# Patient Record
Sex: MALE | Race: BLACK OR AFRICAN AMERICAN
[De-identification: names, ages, dates, MRNs, and addresses within clinical notes are randomized per-mention and may not be internally consistent; named-entity substitution may affect disease eponyms.]

---

## 2020-05-03 ENCOUNTER — HOSPITAL ENCOUNTER (EMERGENCY)
Dept: HOSPITAL 53 - M ED | Age: 45
Discharge: HOME | End: 2020-05-03
Payer: COMMERCIAL

## 2020-05-03 VITALS — BODY MASS INDEX: 36.72 KG/M2 | HEIGHT: 66 IN | WEIGHT: 228.49 LBS

## 2020-05-03 VITALS — DIASTOLIC BLOOD PRESSURE: 58 MMHG | SYSTOLIC BLOOD PRESSURE: 126 MMHG

## 2020-05-03 DIAGNOSIS — R94.31: ICD-10-CM

## 2020-05-03 DIAGNOSIS — F14.90: ICD-10-CM

## 2020-05-03 DIAGNOSIS — E78.5: ICD-10-CM

## 2020-05-03 DIAGNOSIS — F17.210: ICD-10-CM

## 2020-05-03 DIAGNOSIS — E11.9: ICD-10-CM

## 2020-05-03 DIAGNOSIS — R10.84: Primary | ICD-10-CM

## 2020-05-03 DIAGNOSIS — N28.89: ICD-10-CM

## 2020-05-03 DIAGNOSIS — I10: ICD-10-CM

## 2020-05-03 LAB
ALBUMIN SERPL BCG-MCNC: 3.6 GM/DL (ref 3.2–5.2)
ALT SERPL W P-5'-P-CCNC: 32 U/L (ref 12–78)
AMPHETAMINES UR QL SCN: NEGATIVE
BARBITURATES UR QL SCN: NEGATIVE
BASOPHILS # BLD AUTO: 0 10^3/UL (ref 0–0.2)
BASOPHILS NFR BLD AUTO: 0.5 % (ref 0–1)
BENZODIAZ UR QL SCN: NEGATIVE
BILIRUB CONJ SERPL-MCNC: 0.1 MG/DL (ref 0–0.2)
BILIRUB SERPL-MCNC: 0.3 MG/DL (ref 0.2–1)
BZE UR QL SCN: POSITIVE
CANNABINOIDS UR QL SCN: POSITIVE
CK MB CFR.DF SERPL CALC: 0.81
CK MB SERPL-MCNC: 1.4 NG/ML (ref ?–3.6)
CK SERPL-CCNC: 172 U/L (ref 39–308)
EOSINOPHIL # BLD AUTO: 0 10^3/UL (ref 0–0.5)
EOSINOPHIL NFR BLD AUTO: 0.5 % (ref 0–3)
ETHANOL SERPL-MCNC: 0.05 % (ref 0–0.01)
HCT VFR BLD AUTO: 35.7 % (ref 42–52)
HGB BLD-MCNC: 11.6 G/DL (ref 13.5–17.5)
LIPASE SERPL-CCNC: 107 U/L (ref 73–393)
LYMPHOCYTES # BLD AUTO: 1.1 10^3/UL (ref 1.5–5)
LYMPHOCYTES NFR BLD AUTO: 12.6 % (ref 24–44)
MCH RBC QN AUTO: 29.4 PG (ref 27–33)
MCHC RBC AUTO-ENTMCNC: 32.5 G/DL (ref 32–36.5)
MCV RBC AUTO: 90.4 FL (ref 80–96)
METHADONE UR QL SCN: NEGATIVE
MONOCYTES # BLD AUTO: 0.9 10^3/UL (ref 0–0.8)
MONOCYTES NFR BLD AUTO: 9.9 % (ref 0–5)
NEUTROPHILS # BLD AUTO: 6.7 10^3/UL (ref 1.5–8.5)
NEUTROPHILS NFR BLD AUTO: 76 % (ref 36–66)
OPIATES UR QL SCN: NEGATIVE
PCP UR QL SCN: NEGATIVE
PLATELET # BLD AUTO: 314 10^3/UL (ref 150–450)
PROT SERPL-MCNC: 7.6 GM/DL (ref 6.4–8.2)
RBC # BLD AUTO: 3.95 10^6/UL (ref 4.3–6.1)
TROPONIN I SERPL-MCNC: < 0.02 NG/ML (ref ?–0.1)
WBC # BLD AUTO: 8.8 10^3/UL (ref 4–10)

## 2020-05-03 PROCEDURE — 99285 EMERGENCY DEPT VISIT HI MDM: CPT

## 2020-05-03 PROCEDURE — 93005 ELECTROCARDIOGRAM TRACING: CPT

## 2020-05-03 PROCEDURE — 83690 ASSAY OF LIPASE: CPT

## 2020-05-03 PROCEDURE — 80076 HEPATIC FUNCTION PANEL: CPT

## 2020-05-03 PROCEDURE — 82553 CREATINE MB FRACTION: CPT

## 2020-05-03 PROCEDURE — 96360 HYDRATION IV INFUSION INIT: CPT

## 2020-05-03 PROCEDURE — 80307 DRUG TEST PRSMV CHEM ANLYZR: CPT

## 2020-05-03 PROCEDURE — 74177 CT ABD & PELVIS W/CONTRAST: CPT

## 2020-05-03 PROCEDURE — 85025 COMPLETE CBC W/AUTO DIFF WBC: CPT

## 2020-05-03 PROCEDURE — 80047 BASIC METABLC PNL IONIZED CA: CPT

## 2020-05-03 PROCEDURE — 82550 ASSAY OF CK (CPK): CPT

## 2020-05-03 PROCEDURE — 87040 BLOOD CULTURE FOR BACTERIA: CPT

## 2020-05-03 PROCEDURE — 93041 RHYTHM ECG TRACING: CPT

## 2020-05-03 PROCEDURE — 96361 HYDRATE IV INFUSION ADD-ON: CPT

## 2020-05-03 PROCEDURE — 83605 ASSAY OF LACTIC ACID: CPT

## 2020-05-03 NOTE — ED PDOC
Post-Departure Follow-Up


certified letter sent to pt re formal read f ct abd/p . needs fu. please obtain 

pcp name and fax. mlg Lundborg-Gray,Maja MD           May 3, 2020 09:46

## 2020-05-03 NOTE — ECGEPIP
[facility] - ED

                                       

                                       Test Date:    2020

Pat Name:     ANGEL LONDONO          Department:   

Patient ID:   [id number]                 Room:         -

Gender:       Male                     Technician:   ami

:          1975               Requested By: OSEAS GRANDE

Order Number: [id number]     Reading MD:   Maja Lundborg-Gray

                                 Measurements

Intervals                              Axis          

Rate:         119                      P:            43

WV:           165                      QRS:          32

QRSD:         94                       T:            10

QT:           317                                    

QTc:          447                                    

                           Interpretive Statements

SINUS TACHYCARDIA

ABNORMAL RHYTHM ECG

NONSPECIFIC ST T WAVE CHANGES

DELAYED R WAVE PROGRESSION

Electronically Signed on 5-3-2020 6:31:21 EDT by Maja Lundborg-Gray

## 2020-10-03 ENCOUNTER — HOSPITAL ENCOUNTER (EMERGENCY)
Dept: HOSPITAL 53 - M ED | Age: 45
Discharge: HOME | End: 2020-10-03
Payer: COMMERCIAL

## 2020-10-03 VITALS — WEIGHT: 235.5 LBS | BODY MASS INDEX: 36.96 KG/M2 | HEIGHT: 67 IN

## 2020-10-03 VITALS — SYSTOLIC BLOOD PRESSURE: 141 MMHG | DIASTOLIC BLOOD PRESSURE: 88 MMHG

## 2020-10-03 DIAGNOSIS — M79.671: Primary | ICD-10-CM

## 2020-10-03 DIAGNOSIS — Z88.6: ICD-10-CM

## 2020-10-03 DIAGNOSIS — E11.9: ICD-10-CM

## 2020-10-03 DIAGNOSIS — I10: ICD-10-CM

## 2020-10-03 DIAGNOSIS — F17.200: ICD-10-CM

## 2020-10-03 LAB
BASOPHILS # BLD AUTO: 0.1 10^3/UL (ref 0–0.2)
BASOPHILS NFR BLD AUTO: 0.6 % (ref 0–1)
BUN SERPL-MCNC: 9 MG/DL (ref 7–18)
CALCIUM SERPL-MCNC: 9 MG/DL (ref 8.5–10.1)
CHLORIDE SERPL-SCNC: 104 MEQ/L (ref 98–107)
CO2 SERPL-SCNC: 24 MEQ/L (ref 21–32)
CREAT SERPL-MCNC: 1.01 MG/DL (ref 0.7–1.3)
CRP SERPL-MCNC: 1.05 MG/DL (ref 0–0.3)
EOSINOPHIL # BLD AUTO: 0.2 10^3/UL (ref 0–0.5)
EOSINOPHIL NFR BLD AUTO: 2.3 % (ref 0–3)
ERYTHROCYTE [SEDIMENTATION RATE] IN BLOOD BY WESTERGREN METHOD: 37 MM/HR (ref 0–15)
GFR SERPL CREATININE-BSD FRML MDRD: > 60 ML/MIN/{1.73_M2} (ref 60–?)
GLUCOSE SERPL-MCNC: 140 MG/DL (ref 70–100)
HCT VFR BLD AUTO: 35.1 % (ref 42–52)
HGB BLD-MCNC: 11.6 G/DL (ref 13.5–17.5)
LYMPHOCYTES # BLD AUTO: 2 10^3/UL (ref 1.5–5)
LYMPHOCYTES NFR BLD AUTO: 25.8 % (ref 24–44)
MCH RBC QN AUTO: 30.1 PG (ref 27–33)
MCHC RBC AUTO-ENTMCNC: 33 G/DL (ref 32–36.5)
MCV RBC AUTO: 90.9 FL (ref 80–96)
MONOCYTES # BLD AUTO: 0.7 10^3/UL (ref 0–0.8)
MONOCYTES NFR BLD AUTO: 9.1 % (ref 0–5)
NEUTROPHILS # BLD AUTO: 4.9 10^3/UL (ref 1.5–8.5)
NEUTROPHILS NFR BLD AUTO: 61.8 % (ref 36–66)
PLATELET # BLD AUTO: 281 10^3/UL (ref 150–450)
POTASSIUM SERPL-SCNC: 3.6 MEQ/L (ref 3.5–5.1)
RBC # BLD AUTO: 3.86 10^6/UL (ref 4.3–6.1)
SODIUM SERPL-SCNC: 135 MEQ/L (ref 136–145)
URATE SERPL-MCNC: 8.2 MG/DL (ref 3.5–7.2)
WBC # BLD AUTO: 7.8 10^3/UL (ref 4–10)

## 2020-10-03 NOTE — REPVR
PROCEDURE INFORMATION: 

Exam: XR Right Foot Complete 

Exam date and time: 10/3/2020 1:47 PM 

Age: 45 years old 

Clinical indication: Pain; Foot; Right; Additional info: Pain and swelling to 

right foot and great toe 



TECHNIQUE: 

Imaging protocol: XR Right foot. 

Views: 3 or more views. 



COMPARISON: 

No relevant prior studies available. 



FINDINGS: 

Bones/joints: There is a subtle lucent line of the distal end of the middle 

phalanx 1st digit that could represent a hairline fracture or bony trabecular 

injury. There is a 1.4 cm round lucent lesion of the distal diaphysis of the 

1st metatarsal. This is well-circumscribed and probably representing an 

enchondroma. I would recommend a follow-up exam in 6 months for re-evaluation 

of this and to document stability as a precautionary measure. There is a screw 

and plate device distal fibula. There is very prominent osteophyte formation 

along the posterior aspect of the talotibial joint. 

Soft tissues: There is soft tissue swelling at the ankle and proximal foot. 



IMPRESSION: 

1. Subtle lucent line distal end of the middle phalanx 1st digit could be a 

minimal hairline fracture or bony trabecular injury. 

2. 1.4 cm round lucent lesion of the 1st metatarsal is probably an enchondroma 

but recommend follow-up radiograph in 6 months for re-evaluation of this and to 

document stability as a precautionary measure. 



Electronically signed by: Silas Case On 10/03/2020  14:27:16 PM